# Patient Record
(demographics unavailable — no encounter records)

---

## 2024-11-14 NOTE — DISCUSSION/SUMMARY
[No Elimination Concerns] : elimination [Continue Regimen] : feeding [No Skin Concerns] : skin [Normal Sleep Pattern] : sleep [None] : no medical problems [Anticipatory Guidance Given] : Anticipatory guidance addressed as per the history of present illness section [No Vaccines] : no vaccines needed [No Medications] : ~He/She~ is not on any medications [Full Activity without restrictions including Physical Education & Athletics] : Full Activity without restrictions including Physical Education & Athletics [de-identified] : Short stature for puberty status [de-identified] : Likely pretty puberty [FreeTextEntry2] : Sister (21) [FreeTextEntry1] : Haris is a 10 year old female with no PMH who presents for a 10 year old [facility]. It is likely that the patient had pretty puberty and possible that there is not much more growth velocity the patient will have. She saw Endocrinology in 2022. Since she is post-menarche, no intervention at this time.  #Health Maintenance - Counseled on diet and exercise - flu vaccination declined  Return in one year

## 2024-11-14 NOTE — HISTORY OF PRESENT ILLNESS
[Fruit] : fruit [Vegetables] : vegetables [Meat] : meat [Grains] : grains [Dairy] : dairy [Eats meals with family] : eats meals with family [___ stools every other day] : [unfilled]  stools every other day [In own bed] : In own bed [Sleeps ___ hours per night] : sleeps [unfilled] hours per night [Yes] : Patient goes to dentist yearly [Toothpaste] : Primary Fluoride Source: Toothpaste [Normal] : normal [LMP: _____] : LMP: [unfilled] [Cycle Length: _____ days] : Cycle Length: [unfilled] days [Age of Menarche: ____] : Age of Menarche: [unfilled] [Has Friends] : has friends [Grade ___] : Grade [unfilled] [Adequate social interactions] : adequate social interactions [Adequate performance] : adequate performance [Adequate attention] : adequate attention [No difficulties with Homework] : no difficulties with homework [No] : No cigarette smoke exposure [Appropriately restrained in motor vehicle] : appropriately restrained in motor vehicle [Parent knows child's friends] : parent knows child's friends [NO] : No [Up to date] : Up to date [Heavy Bleeding] : no heavy bleeding [Painful Cramps] : no painful cramps [Wears helmet and pads] : does not wear helmet and pads [de-identified] : Sister (21) [FreeTextEntry3] : 9-7 [de-identified] : Once a day brushing teeth [FreeTextEntry9] : Not very active, lots of screen time

## 2024-11-14 NOTE — DISCUSSION/SUMMARY
[No Elimination Concerns] : elimination [Continue Regimen] : feeding [No Skin Concerns] : skin [Normal Sleep Pattern] : sleep [None] : no medical problems [Anticipatory Guidance Given] : Anticipatory guidance addressed as per the history of present illness section [No Vaccines] : no vaccines needed [No Medications] : ~He/She~ is not on any medications [Full Activity without restrictions including Physical Education & Athletics] : Full Activity without restrictions including Physical Education & Athletics [de-identified] : Short stature for puberty status [de-identified] : Likely pretty puberty [FreeTextEntry2] : Sister (21) [FreeTextEntry1] : Haris is a 10 year old female with no PMH who presents for a 10 year old [facility]. It is likely that the patient had pretty puberty and possible that there is not much more growth velocity the patient will have. She saw Endocrinology in 2022. Since she is post-menarche, no intervention at this time.  #Health Maintenance - Counseled on diet and exercise - flu vaccination declined  Return in one year

## 2024-11-14 NOTE — HISTORY OF PRESENT ILLNESS
[Fruit] : fruit [Vegetables] : vegetables [Meat] : meat [Grains] : grains [Dairy] : dairy [Eats meals with family] : eats meals with family [___ stools every other day] : [unfilled]  stools every other day [In own bed] : In own bed [Sleeps ___ hours per night] : sleeps [unfilled] hours per night [Yes] : Patient goes to dentist yearly [Toothpaste] : Primary Fluoride Source: Toothpaste [Normal] : normal [LMP: _____] : LMP: [unfilled] [Cycle Length: _____ days] : Cycle Length: [unfilled] days [Age of Menarche: ____] : Age of Menarche: [unfilled] [Has Friends] : has friends [Grade ___] : Grade [unfilled] [Adequate social interactions] : adequate social interactions [Adequate performance] : adequate performance [Adequate attention] : adequate attention [No difficulties with Homework] : no difficulties with homework [No] : No cigarette smoke exposure [Appropriately restrained in motor vehicle] : appropriately restrained in motor vehicle [Parent knows child's friends] : parent knows child's friends [NO] : No [Up to date] : Up to date [Heavy Bleeding] : no heavy bleeding [Painful Cramps] : no painful cramps [Wears helmet and pads] : does not wear helmet and pads [de-identified] : Sister (21) [FreeTextEntry3] : 9-7 [de-identified] : Once a day brushing teeth [FreeTextEntry9] : Not very active, lots of screen time